# Patient Record
Sex: MALE | URBAN - METROPOLITAN AREA
[De-identification: names, ages, dates, MRNs, and addresses within clinical notes are randomized per-mention and may not be internally consistent; named-entity substitution may affect disease eponyms.]

---

## 2019-10-15 ENCOUNTER — OFFICE VISIT (OUTPATIENT)
Dept: FAMILY MEDICINE | Facility: CLINIC | Age: 19
End: 2019-10-15
Payer: COMMERCIAL

## 2019-10-15 VITALS
TEMPERATURE: 98 F | BODY MASS INDEX: 30.6 KG/M2 | SYSTOLIC BLOOD PRESSURE: 132 MMHG | DIASTOLIC BLOOD PRESSURE: 72 MMHG | WEIGHT: 246.1 LBS | OXYGEN SATURATION: 100 % | HEART RATE: 60 BPM | HEIGHT: 75 IN | RESPIRATION RATE: 16 BRPM

## 2019-10-15 DIAGNOSIS — N62 GYNECOMASTIA: Primary | ICD-10-CM

## 2019-10-15 ASSESSMENT — PAIN SCALES - GENERAL: PAINLEVEL: NO PAIN (0)

## 2019-10-15 ASSESSMENT — MIFFLIN-ST. JEOR: SCORE: 2206.93

## 2019-10-15 NOTE — NURSING NOTE
Chief Complaint   Patient presents with     Breast Problem     Pt complains of breast swelling.         DANIEL Sorenson on 10/15/2019 at 6:22 PM

## 2019-10-15 NOTE — PROGRESS NOTES
"Amarjit Hwang is a 19 year old male who presents today requesting a referral to \"deal\" with his large breasts.  He has been told by friends and family members that an endocrinology referral would be an option.  Amarjit states that he has noticed that his breasts are larger than others in middle school.  He finds this very uncomfortable.  He denies pain, discharge, any other concerns except that they are larger than what he considers normal for a male.    Amarjit was evaluated at age 13 (documented in his current record) for delayed puberty, received 2 doses of testosterone 75 mg and at that point was considered to be \"caught up.\"  He described his testicles as small, even now, the record stating that after the testosterone the testicular volume increased.  He does not believe he has any other symptoms of low testosterone.    Review Of Systems  . ROS: 10 point ROS neg other than the symptoms noted above in the HPI.      Past Medical History:   Diagnosis Date     Acanthosis nigricans      ADD (attention deficit disorder)      Asthma      Obesity      Past Surgical History:   Procedure Laterality Date     HC EXCISION OF CHOLESTEATOMA, MIDDLE EAR  2002     Social History     Socioeconomic History     Marital status: Single     Spouse name: Not on file     Number of children: Not on file     Years of education: Not on file     Highest education level: Not on file   Occupational History     Not on file   Social Needs     Financial resource strain: Not on file     Food insecurity:     Worry: Not on file     Inability: Not on file     Transportation needs:     Medical: Not on file     Non-medical: Not on file   Tobacco Use     Smoking status: Current Some Day Smoker     Packs/day: 0.00     Types: Cigarettes, Other     Smokeless tobacco: Never Used   Substance and Sexual Activity     Alcohol use: Not on file     Drug use: Not on file     Sexual activity: Not on file   Lifestyle     Physical activity:     Days per week: Not on " "file     Minutes per session: Not on file     Stress: Not on file   Relationships     Social connections:     Talks on phone: Not on file     Gets together: Not on file     Attends [employer] service: Not on file     Active member of club or organization: Not on file     Attends meetings of clubs or organizations: Not on file     Relationship status: Not on file     Intimate partner violence:     Fear of current or ex partner: Not on file     Emotionally abused: Not on file     Physically abused: Not on file     Forced sexual activity: Not on file   Other Topics Concern     Not on file   Social History Narrative    [id number]: 10th grade - plays basketball and football     Family History   Problem Relation Age of Onset     Diabetes Maternal Grandfather         T2DM     Diabetes Other         Maternal Great Uncle T1DM     Unknown/Adopted Father         no available history     Diabetes Mother         reports prediabetes - workign on diet     Thyroid Disease Other         maternal great aunt with goiter and hypothyrodism     Gastrointestinal Disease No family hx of         celiac     Connective Tissue Disorder Maternal Grandfather         sarcoidosis     Cerebrovascular Disease Maternal Grandfather        /72 (BP Location: Right arm, Patient Position: Sitting, Cuff Size: Adult Regular)   Pulse 60   Temp 98  F (36.7  C) (Oral)   Resp 16   Ht 1.905 m (6' 3\")   Wt 111.6 kg (246 lb 1.6 oz)   SpO2 100%   BMI 30.76 kg/m      Exam:  Constitutional: healthy, alert and no distress  Breasts:  Soft, fatty tissue bilaterally, no masses, no discharge.    Psychiatric: mentation appears normal and affect normal/bright    Assessment/Plan:  1. Gynecomastia  We discussed potential reasons for gynecomastia: fatty tissue, endocrine imbalance  Amarjit was interested in a referral to further evaluatae  - ENDOCRINOLOGY ADULT REFERRAL    RTC prn    "

## 2019-10-15 NOTE — PATIENT INSTRUCTIONS

## 2019-10-16 NOTE — TELEPHONE ENCOUNTER
RECORDS RECEIVED FROM: [facility] / [facility]   DATE RECEIVED: 10/16/2019   NOTES (FOR ALL VISITS) STATUS DETAILS   OFFICE NOTES from referring provider Internal 10/15/2019 - Jena Chavez NP   OFFICE NOTES from other specialist N/A    ED NOTES N/A    OPERATIVE REPORT  (thyroid, pituitary, adrenal, parathyroid) N/A    MEDICATION LIST N/A    IMAGING      DEXASCAN N/A    MRI (BRAIN) N/A    XR (Chest) N/A    CT (HEAD/NECK/CHEST/ABDOMEN) N/A    NUCLEAR  N/A    ULTRASOUND (HEAD/NECK) N/A    LABS     DIABETES: HBGA1C, CREATININE, FASTING LIPIDS, MICROALBUMIN URINE, POTASSIUM, TSH, T4    THYROID: TSH, T4, CBC, THYRODLONULIN, TOTAL T3, FREE T4, CALCITONIN, CEA N/A

## 2019-10-21 ENCOUNTER — OFFICE VISIT (OUTPATIENT)
Dept: ENDOCRINOLOGY | Facility: CLINIC | Age: 19
End: 2019-10-21
Attending: NURSE PRACTITIONER
Payer: COMMERCIAL

## 2019-10-21 ENCOUNTER — PRE VISIT (OUTPATIENT)
Dept: ENDOCRINOLOGY | Facility: CLINIC | Age: 19
End: 2019-10-21

## 2019-10-21 VITALS
BODY MASS INDEX: 29.52 KG/M2 | DIASTOLIC BLOOD PRESSURE: 78 MMHG | HEART RATE: 75 BPM | WEIGHT: 242.4 LBS | HEIGHT: 76 IN | SYSTOLIC BLOOD PRESSURE: 124 MMHG

## 2019-10-21 DIAGNOSIS — N62 GYNECOMASTIA: ICD-10-CM

## 2019-10-21 DIAGNOSIS — R29.898 TALL STATURE: Primary | ICD-10-CM

## 2019-10-21 DIAGNOSIS — R79.89 LOW TESTOSTERONE LEVEL IN MALE: ICD-10-CM

## 2019-10-21 ASSESSMENT — ENCOUNTER SYMPTOMS
DEPRESSION: 1
HEMOPTYSIS: 0
EYE PAIN: 1
WEIGHT LOSS: 0
EYE WATERING: 0
MYALGIAS: 1
WEIGHT GAIN: 0
COUGH: 1
PANIC: 1
POLYPHAGIA: 0
MUSCLE WEAKNESS: 0
POSTURAL DYSPNEA: 0
NECK PAIN: 0
POLYDIPSIA: 0
STIFFNESS: 1
MUSCLE CRAMPS: 0
NERVOUS/ANXIOUS: 1
NIGHT SWEATS: 0
SHORTNESS OF BREATH: 1
CHILLS: 0
WHEEZING: 0
DOUBLE VISION: 0
FEVER: 0
SPUTUM PRODUCTION: 1
INCREASED ENERGY: 1
INSOMNIA: 1
HALLUCINATIONS: 0
DECREASED CONCENTRATION: 1
FATIGUE: 1
COUGH DISTURBING SLEEP: 0
ARTHRALGIAS: 1
ALTERED TEMPERATURE REGULATION: 0
JOINT SWELLING: 0
EYE REDNESS: 0
SNORES LOUDLY: 0
EYE IRRITATION: 1
DYSPNEA ON EXERTION: 0
DECREASED APPETITE: 1
BACK PAIN: 1

## 2019-10-21 ASSESSMENT — PATIENT HEALTH QUESTIONNAIRE - PHQ9
10. IF YOU CHECKED OFF ANY PROBLEMS, HOW DIFFICULT HAVE THESE PROBLEMS MADE IT FOR YOU TO DO YOUR WORK, TAKE CARE OF THINGS AT HOME, OR GET ALONG WITH OTHER PEOPLE: EXTREMELY DIFFICULT
SUM OF ALL RESPONSES TO PHQ QUESTIONS 1-9: 22
SUM OF ALL RESPONSES TO PHQ QUESTIONS 1-9: 22

## 2019-10-21 ASSESSMENT — MIFFLIN-ST. JEOR: SCORE: 2206.02

## 2019-10-21 ASSESSMENT — PAIN SCALES - GENERAL: PAINLEVEL: NO PAIN (0)

## 2019-10-21 NOTE — LETTER
"10/21/2019       RE: Amarjit Hwang  [address]  [address]  [facility] [place] [address]     Dear Colleague,    Thank you for referring your patient, Amarjit Hwang, to the [facility] ENDOCRINOLOGY at [facility]. Please see a copy of my visit note below.    Endocrine Consult note    Attending Assessment/Plan :     Tall stature, 6'4\".      Gynecomastia concern.  I think we have more pseudogynecomastia than true breast tissue by palpation  I will do a battery of breast tissue related labs    History of low testosterone age 13 (attributed to delayed puberty) treated transiently with testosterone 2014.  He is currently zhou 5 in development with normal testicular volume.  Labs to include AM testosterone, FSH, LH    Hyperpigmentation posterior neck     High PHQ9 score - this was not known at the time of the appt as he had been entering information on the tablet still when he was seen.     Etta Dixon MD    Chief complaint:  Amarjit is a 19 year old male seen in consultation at the request of  Jena Chavez NP for gynecomastia.  I have reviewed Care Everywhere including [facility] lab reports, imaging reports and provider notes as indicated.  I have also reviewed All[employer] for pediatric endocrine records.     HISTORY OF PRESENT ILLNESS  Amarjit reports that breast enlargement was noted since middle school.  It has not been changing or getting worse.    The record shows that he underwent pediatric endocrinology (Dr Miguel Arora) evaluation at age 13 (2014) with diagnosis delayed puberty, acanthosis nigricans.  Testicular volume was 3-4 ml with Zhou 2 pubic hair on 1/17/14.  There was no concern then for gynecomastia.    He was treated with Testosterone 75 mg every 28 days x 4 doses. On 5/16/14 follow up they thought he had progressed with 6 ml testicular volume, zhou IV pubic hair  and mid pubertal phallus, moderate pseudogynecomastia, weight 87.4 kg.     He was " "seen in Weight management clinic 2/16/14 which time his BMI was in the obesity range.      He has been seen in psychiatry in the past (most recent 6/14)  with diagnosis ADHD, treated with Vyvanse.      Weight and BMI  12/4/13 195 lbs BMI 30.15  6/5/14 195# BMI 28.86  today 242#, BMI 29.51    Labs  12/23/13: HgbA1c 5.4%  1/17/14 at 1545 with delayed puberty diagnosis: testosterone 70, FSH 4.1, LH 7, TSH 2.33, free T4 0.96, Ca 9, creatinien 0.81, TTG Vielka IgG 1.1,   2/22/19 [facility] TSH 1.34, 25OHD 7, glucose 91    REVIEW OF SYSTEMS    Elementary school hit head - bump    Past Medical History  Past Medical History:   Diagnosis Date     Acanthosis nigricans 2014     ADD (attention deficit disorder)      Asthma      Delayed puberty 2014     Hypovitaminosis D 2014     Obesity       Past Surgical History:   Procedure Laterality Date     HC EXCISION OF CHOLESTEATOMA, MIDDLE EAR  2002     Medications  Current Outpatient Medications   Medication Sig Dispense Refill     Cholecalciferol (VITAMIN D PO)        lisdexamfetamine (VYVANSE) 20 MG capsule Take 1 capsule (20 mg) by mouth every morning 30 capsule 0     Hasn't been taking the vyvanse    Allergies  Allergies   Allergen Reactions     Kiwi      Family History  family history includes Cerebrovascular Disease in his maternal grandfather; Connective Tissue Disorder in his maternal grandfather; Diabetes in his maternal grandfather, mother, and another family member; Thyroid Disease in an other family member; Unknown/Adopted in his father.    Father 6'1; GF 6'5\"; mother 5'8; GM 6'; brother 5'8 at age 16.      Social History  Social History     Tobacco Use     Smoking status: Current Some Day Smoker     Packs/day: 0.00     Types: Cigarettes, Other     Smokeless tobacco: Never Used   Substance Use Topics     Alcohol use: Not on file     Drug use: Not on file     Girlfriend; daytime hours job; ETOH only if out ;     Physical Exam  /78   Pulse 75   Ht 1.93 m (6' 4\")   " Wt 110 kg (242 lb 6.4 oz)   BMI 29.51 kg/m     Body mass index is 29.51 kg/m .  GENERAL : very tall young man   In no apparent distress  SKIN: beard; Normal color, normal temperature.  No  purple striae.   Dark skin on posterior neck diffusely sugestive of acanthosis nigricans; mole   EYES: PERRL, EOMI, No scleral icterus,  No proptosis, conjunctival redness, stare, retraction  MOUTH: Moist, pink; pharynx clear; lips have some pigmentation variation  NECK: No visible masses. No palpable adenopathy, or masses. No carotid bruits.   THYROID:  Normal, nontender, smooth / firm texture,  no nodules, no Bruit   RESP: Lungs clear to auscultation bilaterally  CARDIAC: Regular rate and rhythm, normal S1 S2, without murmurs, rubs or gallops    ABDOMEN: Normal bowel sounds; soft, nontender, no HSM or masses       NEURO: awake, alert, responds appropriately to questions.  Cranial nerves intact.  Moves all extremities; Gait normal.  No tremor of the outstretched hand.  DTRs   1/4 ,   EXTREMITIES: No clubbing, cyanosis or edema.  BREAST: very little glandular feeling tissue  Genitalia: tetsticular volume right 20-25 ml; left 15-20 ml; normal phallus; Kan 5.      DATA REVIEW     Ref. Range 1/17/2014 15:45   Sodium Latest Ref Range: 133 - 143 mmol/L 139   Potassium Latest Ref Range: 3.4 - 5.3 mmol/L 4.1   Chloride Latest Ref Range: 98 - 110 mmol/L 103   Carbon Dioxide Latest Ref Range: 20 - 32 mmol/L 27   Urea Nitrogen Latest Ref Range: 5 - 24 mg/dL 12   Creatinine Latest Ref Range: 0.50 - 1.00 mg/dL 0.81   GFR Estimate Latest Units: mL/min/1.7m2 GFR not calculated, patient <16 years old.   GFR Estimate If Black Latest Units: mL/min/1.7m2 GFR not calculated, patient <16 years old.   Calcium Latest Ref Range: 8.7 - 10.8 mg/dL 9.0   Anion Gap Latest Ref Range: 6 - 17 mmol/L 9   FSH Latest Ref Range: 0.4 - 18.5 IU/L 4.1   T4 Free Latest Ref Range: 0.70 - 1.85 ng/dL 0.96   Testosterone Total Latest Ref Range: 100 -  1,200 ng/dL 70 (L)   Tissue Transglutaminase Antibody IgA Latest Units: U/mL <1.0...   Tissue Transglutaminase Vielka IgG Latest Units: U/mL 1.1   TSH Latest Ref Range: 0.4 - 5.0 mU/L 2.33   Glucose Latest Ref Range: 60 - 99 mg/dL 90        Ref. Range 1/17/2014 15:45   FSH Latest Ref Range: 0.4 - 18.5 IU/L 4.1   IGA Latest Ref Range: 70 - 380 mg/dL 89   Lutropin Latest Ref Range: 0.5 - 10.8 IU/L 7.0       Again, thank you for allowing me to participate in the care of your patient.      Sincerely,    Etta Dixon MD

## 2019-10-21 NOTE — PROGRESS NOTES
"Endocrine Consult note    Attending Assessment/Plan :     Tall stature, 6'4\".      Gynecomastia concern.  I think we have more pseudogynecomastia than true breast tissue by palpation  I will do a battery of breast tissue related labs    Addendum: the AM labs performed on 1/6/2020 are all normal except for low IGF1, which could be due to diet.     History of low testosterone age 13 (attributed to delayed puberty) treated transiently with testosterone 2014.  He is currently zhou 5 in development with normal testicular volume.  Labs to include AM testosterone, FSH, LH    Hyperpigmentation posterior neck     High PHQ9 score - this was not known at the time of the appt as he had been entering information on the tablet still when he was seen.     Etta Dixon MD    Chief complaint:  Amarjit is a 19 year old male seen in consultation at the request of  Jena Chavez NP for gynecomastia.  I have reviewed Care Everywhere including [facility] lab reports, imaging reports and provider notes as indicated.  I have also reviewed Allscripts for pediatric endocrine records.     HISTORY OF PRESENT ILLNESS  Amarjit reports that breast enlargement was noted since middle school.  It has not been changing or getting worse.    The record shows that he underwent pediatric endocrinology (Dr Miguel Arora) evaluation at age 13 (2014) with diagnosis delayed puberty, acanthosis nigricans.  Testicular volume was 3-4 ml with Zhou 2 pubic hair on 1/17/14.  There was no concern then for gynecomastia.    He was treated with Testosterone 75 mg every 28 days x 4 doses. On 5/16/14 follow up they thought he had progressed with 6 ml testicular volume, zhou IV pubic hair  and mid pubertal phallus, moderate pseudogynecomastia, weight 87.4 kg.     He was seen in Weight management clinic 2/16/14 which time his BMI was in the obesity range.      He has been seen in psychiatry in the past (most recent 6/14)  with diagnosis ADHD, treated with " Vyvanse.      Weight and BMI  12/4/13 195 lbs BMI 30.15  6/5/14 195# BMI 28.86  today 242#, BMI 29.51    Labs  12/23/13: HgbA1c 5.4%  1/17/14 at 1545 with delayed puberty diagnosis: testosterone 70, FSH 4.1, LH 7, TSH 2.33, free T4 0.96, Ca 9, creatinien 0.81, TTG Vielka IgG 1.1,   2/22/19 [facility] TSH 1.34, 25OHD 7, glucose 91    REVIEW OF SYSTEMS    Elementary school hit head - bump    Answers for HPI/ROS submitted by the patient on 10/21/2019 -- thsi information became available AFTER the appt -  If you checked off any problems, how difficult have these problems made it for you to do your work, take care of things at home, or get along with other people?: Extremely difficult  PHQ9 TOTAL SCORE: 22  General Symptoms: Yes  Skin Symptoms: No  HENT Symptoms: No  EYE SYMPTOMS: Yes  HEART SYMPTOMS: No  LUNG SYMPTOMS: Yes  INTESTINAL SYMPTOMS: No  URINARY SYMPTOMS: No  REPRODUCTIVE SYMPTOMS: No  SKELETAL SYMPTOMS: Yes  BLOOD SYMPTOMS: No  NERVOUS SYSTEM SYMPTOMS: No  MENTAL HEALTH SYMPTOMS: Yes  Fever: No  Loss of appetite: Yes  - eats once/day, not that hungry  Weight loss: No  Weight gain: No  Fatigue: Yes; goes to sleep after midnight, sometimes to 3 AM; up at 6 AM; no napping;   Night sweats: No  Chills: No  Increased stress: Yes  Excessive hunger: No  Excessive thirst: No  Feeling hot or cold when others believe the temperature is normal: No  Loss of height: No  Post-operative complications: No  Surgical site pain: No  Hallucinations: No  Change in or Loss of Energy: Yes  Hyperactivity: No  Confusion: No  Eye pain: Yes  Vision loss: Yes  Dry eyes: No  Watery eyes: No  Eye bulging: No  Double vision: No  Flashing of lights: No  Spots: No  Floaters: No  Redness: No  Crossed eyes: No  Tunnel Vision: No  Yellowing of eyes: No  Eye irritation: Yes  Cough: Yes  Sputum or phlegm: Yes  Coughing up blood: No  Difficulty breating or shortness of breath: Yes  Snoring: No  Wheezing: No  Difficulty breathing on exertion:  "No  Nighttime Cough: No  Difficulty breathing when lying flat: No  Back pain: Yes  Muscle aches: Yes  Neck pain: No  Swollen joints: No  Joint pain: Yes  Bone pain: No  Muscle cramps: No  Muscle weakness: No  Joint stiffness: Yes  Bone fracture: No  Nervous or Anxious: Yes  Depression: Yes  Trouble sleeping: Yes  Trouble thinking or concentrating: Yes  Mood changes: Yes  Panic attacks: Yes    Past Medical History  Past Medical History:   Diagnosis Date     Acanthosis nigricans 2014     ADD (attention deficit disorder)      Asthma      Delayed puberty 2014     Hypovitaminosis D 2014     Obesity       Past Surgical History:   Procedure Laterality Date     HC EXCISION OF CHOLESTEATOMA, MIDDLE EAR  2002     Medications  Current Outpatient Medications   Medication Sig Dispense Refill     Cholecalciferol (VITAMIN D PO)        lisdexamfetamine (VYVANSE) 20 MG capsule Take 1 capsule (20 mg) by mouth every morning 30 capsule 0     Hasn't been taking the vyvanse    Allergies  Allergies   Allergen Reactions     Kiwi      Family History  family history includes Cerebrovascular Disease in his maternal grandfather; Connective Tissue Disorder in his maternal grandfather; Diabetes in his maternal grandfather, mother, and another family member; Thyroid Disease in an other family member; Unknown/Adopted in his father.    Father 6'1; GF 6'5\"; mother 5'8; GM 6'; brother 5'8 at age 16.      Social History  Social History     Tobacco Use     Smoking status: Current Some Day Smoker     Packs/day: 0.00     Types: Cigarettes, Other     Smokeless tobacco: Never Used   Substance Use Topics     Alcohol use: Not on file     Drug use: Not on file     Girlfriend; daytime hours job; ETOH only if out ;     Physical Exam  /78   Pulse 75   Ht 1.93 m (6' 4\")   Wt 110 kg (242 lb 6.4 oz)   BMI 29.51 kg/m    Body mass index is 29.51 kg/m .  GENERAL : very tall young man   In no apparent distress  SKIN: beard; Normal color, " normal temperature.  No  purple striae.   Dark skin on posterior neck diffusely sugestive of acanthosis nigricans; mole   EYES: PERRL, EOMI, No scleral icterus,  No proptosis, conjunctival redness, stare, retraction  MOUTH: Moist, pink; pharynx clear; lips have some pigmentation variation  NECK: No visible masses. No palpable adenopathy, or masses. No carotid bruits.   THYROID:  Normal, nontender, smooth / firm texture,  no nodules, no Bruit   RESP: Lungs clear to auscultation bilaterally  CARDIAC: Regular rate and rhythm, normal S1 S2, without murmurs, rubs or gallops    ABDOMEN: Normal bowel sounds; soft, nontender, no HSM or masses       NEURO: awake, alert, responds appropriately to questions.  Cranial nerves intact.  Moves all extremities; Gait normal.  No tremor of the outstretched hand.  DTRs   1/4 ,   EXTREMITIES: No clubbing, cyanosis or edema.  BREAST: very little glandular feeling tissue  Genitalia: tetsticular volume right 20-25 ml; left 15-20 ml; normal phallus; Kan 5.      DATA REVIEW     Ref. Range 1/17/2014 15:45   Sodium Latest Ref Range: 133 - 143 mmol/L 139   Potassium Latest Ref Range: 3.4 - 5.3 mmol/L 4.1   Chloride Latest Ref Range: 98 - 110 mmol/L 103   Carbon Dioxide Latest Ref Range: 20 - 32 mmol/L 27   Urea Nitrogen Latest Ref Range: 5 - 24 mg/dL 12   Creatinine Latest Ref Range: 0.50 - 1.00 mg/dL 0.81   GFR Estimate Latest Units: mL/min/1.7m2 GFR not calculated, patient <16 years old.   GFR Estimate If Black Latest Units: mL/min/1.7m2 GFR not calculated, patient <16 years old.   Calcium Latest Ref Range: 8.7 - 10.8 mg/dL 9.0   Anion Gap Latest Ref Range: 6 - 17 mmol/L 9   FSH Latest Ref Range: 0.4 - 18.5 IU/L 4.1   T4 Free Latest Ref Range: 0.70 - 1.85 ng/dL 0.96   Testosterone Total Latest Ref Range: 100 - 1,200 ng/dL 70 (L)   Tissue Transglutaminase Antibody IgA Latest Units: U/mL <1.0...   Tissue Transglutaminase Vielka IgG Latest Units: U/mL 1.1   TSH Latest Ref Range: 0.4 - 5.0 mU/L  2.33   Glucose Latest Ref Range: 60 - 99 mg/dL 90        Ref. Range 1/17/2014 15:45   FSH Latest Ref Range: 0.4 - 18.5 IU/L 4.1   IGA Latest Ref Range: 70 - 380 mg/dL 89   Lutropin Latest Ref Range: 0.5 - 10.8 IU/L 7.0       Results for ARYA TAVERAS (MRN [id number]) as of 1/9/2020 11:20   Ref. Range 1/6/2020 07:23   Beta-HCG Tumor Marker Latest Ref Range: <5 IU/L <3   Cortisol Serum Latest Ref Range: 4 - 22 ug/dL 14.7   Estradiol Latest Ref Range: 6 - 50 pg/mL 39   Free Testosterone Calculated Latest Ref Range: 4.7 - 24.4 ng/dL 14.67   FSH Latest Ref Range: 0.7 - 10.8 IU/L 3.2   Prolactin Latest Ref Range: 2 - 18 ug/L 11   Testosterone Total Latest Ref Range: 240 - 950 ng/dL 636   Ins Growth Factor 1 Latest Ref Range: 127 - 364 ng/ml 83 (L)   Lutropin Latest Ref Range: 1.5 - 9.3 IU/L 6.8   Sex Hormone Binding Globulin Latest Ref Range: 11 - 80 nmol/L 30

## 2019-10-22 ASSESSMENT — PATIENT HEALTH QUESTIONNAIRE - PHQ9: SUM OF ALL RESPONSES TO PHQ QUESTIONS 1-9: 22

## 2019-11-10 ENCOUNTER — TELEPHONE (OUTPATIENT)
Dept: ENDOCRINOLOGY | Facility: CLINIC | Age: 19
End: 2019-11-10

## 2019-11-10 PROBLEM — R79.89 LOW TESTOSTERONE LEVEL IN MALE: Status: ACTIVE | Noted: 2019-11-10

## 2019-11-10 PROBLEM — N62 GYNECOMASTIA: Status: ACTIVE | Noted: 2019-11-10

## 2019-11-10 PROBLEM — R29.898 TALL STATURE: Status: ACTIVE | Noted: 2019-11-10

## 2019-11-10 NOTE — TELEPHONE ENCOUNTER
It appears the ROS that he filled out on the tablet on the day of the appt added up depression scores and gave him PHQ 9 score of 22?  IT says this on the ROS but I can't find an actual PHQ9 questionnaire on his chart.  Can you help me figure out what happened?  Did he have a high depression score?  If this is real, then he needs follow up related to it as I do not believe we had the tablet data at the time of the appt.  Can you reach out to him on this issue?  Also, remind him we are still awaiting the AM labs.  Thanks.  Etta Dixon MD

## 2019-11-11 NOTE — TELEPHONE ENCOUNTER
Rosy can  you help me find  this score of PHQ 9 . If it were on the tablet shouldn't it be  In his chart ? I will call him  but wanted to  know  what the 22 was on . Qian Kang RN on 11/11/2019 at 12:36 PM

## 2019-11-11 NOTE — TELEPHONE ENCOUNTER
"Patient was roomed and vitals were taken at 5:49pm. The PHQ-9 can be located under \"screening\" tab in Epic. It shows that the patient finished his PHQ-9 at 5:50pm on the tablet after the rooming staff completed obtaining his information.     Qian, we should follow up via phone and explain that the results weren't saved/ viewable at the time of the visit.   "

## 2019-11-12 DIAGNOSIS — F32.A DEPRESSION: Primary | ICD-10-CM

## 2020-01-06 DIAGNOSIS — R79.89 LOW TESTOSTERONE LEVEL IN MALE: ICD-10-CM

## 2020-01-06 DIAGNOSIS — N62 GYNECOMASTIA: ICD-10-CM

## 2020-01-06 DIAGNOSIS — R29.898 TALL STATURE: ICD-10-CM

## 2020-01-06 LAB
CORTIS SERPL-MCNC: 14.7 UG/DL (ref 4–22)
ESTRADIOL SERPL-MCNC: 39 PG/ML (ref 6–50)
FSH SERPL-ACNC: 3.2 IU/L (ref 0.7–10.8)
HCG-TM SERPL-ACNC: <3 IU/L
LH SERPL-ACNC: 6.8 IU/L (ref 1.5–9.3)
PROLACTIN SERPL-MCNC: 11 UG/L (ref 2–18)

## 2020-01-06 PROCEDURE — 84702 CHORIONIC GONADOTROPIN TEST: CPT

## 2020-01-06 PROCEDURE — 83002 ASSAY OF GONADOTROPIN (LH): CPT

## 2020-01-06 PROCEDURE — 82533 TOTAL CORTISOL: CPT

## 2020-01-06 PROCEDURE — 84403 ASSAY OF TOTAL TESTOSTERONE: CPT

## 2020-01-06 PROCEDURE — 36415 COLL VENOUS BLD VENIPUNCTURE: CPT

## 2020-01-06 PROCEDURE — 83001 ASSAY OF GONADOTROPIN (FSH): CPT

## 2020-01-06 PROCEDURE — 84146 ASSAY OF PROLACTIN: CPT

## 2020-01-06 PROCEDURE — 84305 ASSAY OF SOMATOMEDIN: CPT

## 2020-01-06 PROCEDURE — 82670 ASSAY OF TOTAL ESTRADIOL: CPT

## 2020-01-06 PROCEDURE — 84270 ASSAY OF SEX HORMONE GLOBUL: CPT

## 2020-01-07 LAB — IGF-I BLD-MCNC: 83 NG/ML (ref 127–364)

## 2020-01-08 LAB
SHBG SERPL-SCNC: 30 NMOL/L (ref 11–80)
TESTOST FREE SERPL-MCNC: 14.67 NG/DL (ref 4.7–24.4)
TESTOST SERPL-MCNC: 636 NG/DL (ref 240–950)

## 2020-02-17 ENCOUNTER — HEALTH MAINTENANCE LETTER (OUTPATIENT)
Age: 20
End: 2020-02-17

## 2020-11-29 ENCOUNTER — HEALTH MAINTENANCE LETTER (OUTPATIENT)
Age: 20
End: 2020-11-29

## 2021-04-10 ENCOUNTER — HEALTH MAINTENANCE LETTER (OUTPATIENT)
Age: 21
End: 2021-04-10

## 2021-09-25 ENCOUNTER — HEALTH MAINTENANCE LETTER (OUTPATIENT)
Age: 21
End: 2021-09-25

## 2022-05-01 ENCOUNTER — HEALTH MAINTENANCE LETTER (OUTPATIENT)
Age: 22
End: 2022-05-01

## 2022-12-26 ENCOUNTER — HEALTH MAINTENANCE LETTER (OUTPATIENT)
Age: 22
End: 2022-12-26

## 2023-06-02 ENCOUNTER — HEALTH MAINTENANCE LETTER (OUTPATIENT)
Age: 23
End: 2023-06-02